# Patient Record
Sex: FEMALE | Race: BLACK OR AFRICAN AMERICAN | NOT HISPANIC OR LATINO | URBAN - METROPOLITAN AREA
[De-identification: names, ages, dates, MRNs, and addresses within clinical notes are randomized per-mention and may not be internally consistent; named-entity substitution may affect disease eponyms.]

---

## 2017-04-06 ENCOUNTER — EMERGENCY (EMERGENCY)
Facility: HOSPITAL | Age: 37
LOS: 1 days | Discharge: HOME | End: 2017-04-06
Attending: EMERGENCY MEDICINE
Payer: COMMERCIAL

## 2017-04-06 VITALS
HEIGHT: 66 IN | TEMPERATURE: 98 F | DIASTOLIC BLOOD PRESSURE: 76 MMHG | SYSTOLIC BLOOD PRESSURE: 123 MMHG | HEART RATE: 85 BPM | OXYGEN SATURATION: 98 % | RESPIRATION RATE: 18 BRPM | WEIGHT: 139.99 LBS

## 2017-04-06 VITALS
HEART RATE: 76 BPM | DIASTOLIC BLOOD PRESSURE: 68 MMHG | OXYGEN SATURATION: 98 % | SYSTOLIC BLOOD PRESSURE: 103 MMHG | TEMPERATURE: 98 F | RESPIRATION RATE: 18 BRPM

## 2017-04-06 LAB
ALBUMIN SERPL ELPH-MCNC: 4.7 G/DL — SIGNIFICANT CHANGE UP (ref 3.5–5)
ALP SERPL-CCNC: 72 U/L — SIGNIFICANT CHANGE UP (ref 40–120)
ALT FLD-CCNC: 26 U/L DA — SIGNIFICANT CHANGE UP (ref 10–60)
ANION GAP SERPL CALC-SCNC: 7 MMOL/L — SIGNIFICANT CHANGE UP (ref 5–17)
APPEARANCE UR: CLEAR — SIGNIFICANT CHANGE UP
AST SERPL-CCNC: 18 U/L — SIGNIFICANT CHANGE UP (ref 10–40)
BASOPHILS # BLD AUTO: 0.1 K/UL — SIGNIFICANT CHANGE UP (ref 0–0.2)
BASOPHILS NFR BLD AUTO: 1.5 % — SIGNIFICANT CHANGE UP (ref 0–2)
BILIRUB SERPL-MCNC: 0.7 MG/DL — SIGNIFICANT CHANGE UP (ref 0.2–1.2)
BILIRUB UR-MCNC: NEGATIVE — SIGNIFICANT CHANGE UP
BUN SERPL-MCNC: 11 MG/DL — SIGNIFICANT CHANGE UP (ref 7–18)
CALCIUM SERPL-MCNC: 9.5 MG/DL — SIGNIFICANT CHANGE UP (ref 8.4–10.5)
CHLORIDE SERPL-SCNC: 106 MMOL/L — SIGNIFICANT CHANGE UP (ref 96–108)
CO2 SERPL-SCNC: 27 MMOL/L — SIGNIFICANT CHANGE UP (ref 22–31)
COLOR SPEC: YELLOW — SIGNIFICANT CHANGE UP
CREAT SERPL-MCNC: 0.76 MG/DL — SIGNIFICANT CHANGE UP (ref 0.5–1.3)
DIFF PNL FLD: ABNORMAL
EOSINOPHIL # BLD AUTO: 0.1 K/UL — SIGNIFICANT CHANGE UP (ref 0–0.5)
EOSINOPHIL NFR BLD AUTO: 1.3 % — SIGNIFICANT CHANGE UP (ref 0–6)
GLUCOSE SERPL-MCNC: 84 MG/DL — SIGNIFICANT CHANGE UP (ref 70–99)
GLUCOSE UR QL: NEGATIVE — SIGNIFICANT CHANGE UP
HCG UR QL: NEGATIVE — SIGNIFICANT CHANGE UP
HCT VFR BLD CALC: 47 % — HIGH (ref 34.5–45)
HGB BLD-MCNC: 15.1 G/DL — SIGNIFICANT CHANGE UP (ref 11.5–15.5)
KETONES UR-MCNC: NEGATIVE — SIGNIFICANT CHANGE UP
LEUKOCYTE ESTERASE UR-ACNC: NEGATIVE — SIGNIFICANT CHANGE UP
LIDOCAIN IGE QN: 179 U/L — SIGNIFICANT CHANGE UP (ref 73–393)
LYMPHOCYTES # BLD AUTO: 1.6 K/UL — SIGNIFICANT CHANGE UP (ref 1–3.3)
LYMPHOCYTES # BLD AUTO: 29 % — SIGNIFICANT CHANGE UP (ref 13–44)
MCHC RBC-ENTMCNC: 27.9 PG — SIGNIFICANT CHANGE UP (ref 27–34)
MCHC RBC-ENTMCNC: 32.1 GM/DL — SIGNIFICANT CHANGE UP (ref 32–36)
MCV RBC AUTO: 86.9 FL — SIGNIFICANT CHANGE UP (ref 80–100)
MONOCYTES # BLD AUTO: 0.3 K/UL — SIGNIFICANT CHANGE UP (ref 0–0.9)
MONOCYTES NFR BLD AUTO: 6 % — SIGNIFICANT CHANGE UP (ref 2–14)
NEUTROPHILS # BLD AUTO: 3.4 K/UL — SIGNIFICANT CHANGE UP (ref 1.8–7.4)
NEUTROPHILS NFR BLD AUTO: 62.2 % — SIGNIFICANT CHANGE UP (ref 43–77)
NITRITE UR-MCNC: NEGATIVE — SIGNIFICANT CHANGE UP
PH UR: 6 — SIGNIFICANT CHANGE UP (ref 4.8–8)
PLATELET # BLD AUTO: 247 K/UL — SIGNIFICANT CHANGE UP (ref 150–400)
POTASSIUM SERPL-MCNC: 4.1 MMOL/L — SIGNIFICANT CHANGE UP (ref 3.5–5.3)
POTASSIUM SERPL-SCNC: 4.1 MMOL/L — SIGNIFICANT CHANGE UP (ref 3.5–5.3)
PROT SERPL-MCNC: 8.6 G/DL — HIGH (ref 6–8.3)
PROT UR-MCNC: NEGATIVE — SIGNIFICANT CHANGE UP
RBC # BLD: 5.41 M/UL — HIGH (ref 3.8–5.2)
RBC # FLD: 13.3 % — SIGNIFICANT CHANGE UP (ref 10.3–14.5)
SODIUM SERPL-SCNC: 140 MMOL/L — SIGNIFICANT CHANGE UP (ref 135–145)
SP GR SPEC: 1.01 — SIGNIFICANT CHANGE UP (ref 1.01–1.02)
UROBILINOGEN FLD QL: NEGATIVE — SIGNIFICANT CHANGE UP
WBC # BLD: 5.5 K/UL — SIGNIFICANT CHANGE UP (ref 3.8–10.5)
WBC # FLD AUTO: 5.5 K/UL — SIGNIFICANT CHANGE UP (ref 3.8–10.5)

## 2017-04-06 PROCEDURE — 80053 COMPREHEN METABOLIC PANEL: CPT

## 2017-04-06 PROCEDURE — 71046 X-RAY EXAM CHEST 2 VIEWS: CPT

## 2017-04-06 PROCEDURE — 71020: CPT | Mod: 26

## 2017-04-06 PROCEDURE — 83690 ASSAY OF LIPASE: CPT

## 2017-04-06 PROCEDURE — 99053 MED SERV 10PM-8AM 24 HR FAC: CPT

## 2017-04-06 PROCEDURE — 85027 COMPLETE CBC AUTOMATED: CPT

## 2017-04-06 PROCEDURE — 76705 ECHO EXAM OF ABDOMEN: CPT

## 2017-04-06 PROCEDURE — 99285 EMERGENCY DEPT VISIT HI MDM: CPT

## 2017-04-06 PROCEDURE — 81001 URINALYSIS AUTO W/SCOPE: CPT

## 2017-04-06 PROCEDURE — 99284 EMERGENCY DEPT VISIT MOD MDM: CPT | Mod: 25

## 2017-04-06 PROCEDURE — 76705 ECHO EXAM OF ABDOMEN: CPT | Mod: 26

## 2017-04-06 PROCEDURE — 81025 URINE PREGNANCY TEST: CPT

## 2017-04-06 NOTE — ED ADULT NURSE NOTE - CHIEF COMPLAINT QUOTE
came back from [facility] last march 29,right upper abdominal pain since saturday,nausea,

## 2017-04-06 NOTE — ED ADULT TRIAGE NOTE - CHIEF COMPLAINT QUOTE
came back from [facility] last march 29,right upper abdominal pain since saturday,nausea,

## 2017-04-06 NOTE — ED ADULT NURSE NOTE - OBJECTIVE STATEMENT
37 Years old female received sitting in bed alert and oriented x3, breathing spontaneously on room, patient stated she has been experience persistent RUQ pains from Saturday with nausea. Denies any fever, vomiting, diarrhea or constipation. P/S 7/10. Patient stated that she has fibroids.

## 2017-04-06 NOTE — ED PROVIDER NOTE - OBJECTIVE STATEMENT
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 Pt also notes nausea. Pt reports no clear association with food and no sx like this in the past. Pt returned from [facility] 2 weeks ago. Pt is attempting to get pregnant, not using hormonal treatments. Pt denies surgeries, vomiting, diarrhea, fever, chills, CP, SOB, respiration variation in pain, leg swelling or any other complaints at this time. NKDA. 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 Pt also notes nausea. Pt reports no clear association with food and no sx like this in the past. Pt returned from [facility] 2 weeks ago. Pt is attempting to get pregnant, not using hormonal treatments. Pt denies surgeries, vomiting, diarrhea, fever, chills, CP, SOB, respiration variation in pain, leg swelling or any other complaints at this time. NKDA. LMP: 3/10/17 Endorses vaginal spotting today.

## 2017-04-06 NOTE — ED PROVIDER NOTE - NS ED MD SCRIBE ATTENDING SCRIBE SECTIONS
VITAL SIGNS( Pullset)/HISTORY OF PRESENT ILLNESS/PHYSICAL EXAM/REVIEW OF SYSTEMS/PAST MEDICAL/SURGICAL/SOCIAL HISTORY/DISPOSITION/HIV

## 2017-04-06 NOTE — ED PROVIDER NOTE - MEDICAL DECISION MAKING DETAILS
38 y/o F with 4 days worsening RUQ pain and tenderness on exam consider gallbladder pathology. Unlikely pulmonary pathology. Will confirm with sono, labs, urine preg and reassess. Pt is declining analgesia at this time.

## 2017-04-10 DIAGNOSIS — Z91.013 ALLERGY TO SEAFOOD: ICD-10-CM

## 2017-04-10 DIAGNOSIS — R11.0 NAUSEA: ICD-10-CM

## 2017-04-10 DIAGNOSIS — R10.11 RIGHT UPPER QUADRANT PAIN: ICD-10-CM

## 2018-01-31 PROBLEM — Z00.00 ENCOUNTER FOR PREVENTIVE HEALTH EXAMINATION: Status: ACTIVE | Noted: 2018-01-31

## 2018-02-12 ENCOUNTER — APPOINTMENT (OUTPATIENT)
Dept: HUMAN REPRODUCTION | Facility: CLINIC | Age: 38
End: 2018-02-12

## 2018-04-04 ENCOUNTER — EMERGENCY (EMERGENCY)
Facility: HOSPITAL | Age: 38
LOS: 1 days | Discharge: HOME | End: 2018-04-04
Attending: EMERGENCY MEDICINE | Admitting: EMERGENCY MEDICINE
Payer: COMMERCIAL

## 2018-04-04 VITALS
DIASTOLIC BLOOD PRESSURE: 77 MMHG | OXYGEN SATURATION: 99 % | RESPIRATION RATE: 18 BRPM | HEART RATE: 93 BPM | SYSTOLIC BLOOD PRESSURE: 118 MMHG | WEIGHT: 136.91 LBS | TEMPERATURE: 98 F | HEIGHT: 66 IN

## 2018-04-04 DIAGNOSIS — Z91.013 ALLERGY TO SEAFOOD: ICD-10-CM

## 2018-04-04 DIAGNOSIS — R10.13 EPIGASTRIC PAIN: ICD-10-CM

## 2018-04-04 DIAGNOSIS — O26.892 OTHER SPECIFIED PREGNANCY RELATED CONDITIONS, SECOND TRIMESTER: ICD-10-CM

## 2018-04-04 DIAGNOSIS — Z88.8 ALLERGY STATUS TO OTHER DRUGS, MEDICAMENTS AND BIOLOGICAL SUBSTANCES: ICD-10-CM

## 2018-04-04 DIAGNOSIS — Z3A.15 15 WEEKS GESTATION OF PREGNANCY: ICD-10-CM

## 2018-04-04 PROCEDURE — 99284 EMERGENCY DEPT VISIT MOD MDM: CPT | Mod: 25

## 2018-04-04 NOTE — ED ADULT TRIAGE NOTE - CHIEF COMPLAINT QUOTE
Patient 15 weeks pregnant. C/O epigastric pain, nausea, 1 episode of vomiting. denies vaginal bleeding.

## 2018-04-05 VITALS
SYSTOLIC BLOOD PRESSURE: 112 MMHG | TEMPERATURE: 99 F | RESPIRATION RATE: 18 BRPM | OXYGEN SATURATION: 99 % | DIASTOLIC BLOOD PRESSURE: 72 MMHG | HEART RATE: 81 BPM

## 2018-04-05 PROCEDURE — 99284 EMERGENCY DEPT VISIT MOD MDM: CPT | Mod: 25

## 2018-04-05 PROCEDURE — 83690 ASSAY OF LIPASE: CPT

## 2018-04-05 PROCEDURE — 85730 THROMBOPLASTIN TIME PARTIAL: CPT

## 2018-04-05 PROCEDURE — 84702 CHORIONIC GONADOTROPIN TEST: CPT

## 2018-04-05 PROCEDURE — 85610 PROTHROMBIN TIME: CPT

## 2018-04-05 PROCEDURE — 81003 URINALYSIS AUTO W/O SCOPE: CPT

## 2018-04-05 PROCEDURE — 96374 THER/PROPH/DIAG INJ IV PUSH: CPT

## 2018-04-05 PROCEDURE — 36415 COLL VENOUS BLD VENIPUNCTURE: CPT

## 2018-04-05 PROCEDURE — 87086 URINE CULTURE/COLONY COUNT: CPT

## 2018-04-05 PROCEDURE — 85025 COMPLETE CBC W/AUTO DIFF WBC: CPT

## 2018-04-05 PROCEDURE — 80053 COMPREHEN METABOLIC PANEL: CPT

## 2018-04-05 RX ORDER — SODIUM CHLORIDE 9 MG/ML
1000 INJECTION INTRAMUSCULAR; INTRAVENOUS; SUBCUTANEOUS ONCE
Qty: 0 | Refills: 0 | Status: DISCONTINUED | OUTPATIENT
Start: 2018-04-05 | End: 2018-04-05

## 2018-04-05 RX ORDER — FAMOTIDINE 10 MG/ML
20 INJECTION INTRAVENOUS ONCE
Qty: 0 | Refills: 0 | Status: COMPLETED | OUTPATIENT
Start: 2018-04-05 | End: 2018-04-05

## 2018-04-05 RX ORDER — SODIUM CHLORIDE 9 MG/ML
1000 INJECTION INTRAMUSCULAR; INTRAVENOUS; SUBCUTANEOUS ONCE
Qty: 0 | Refills: 0 | Status: COMPLETED | OUTPATIENT
Start: 2018-04-05 | End: 2018-04-05

## 2018-04-05 RX ORDER — SUCRALFATE 1 G
1 TABLET ORAL ONCE
Qty: 0 | Refills: 0 | Status: COMPLETED | OUTPATIENT
Start: 2018-04-05 | End: 2018-04-05

## 2018-04-05 RX ORDER — SUCRALFATE 1 G
1 TABLET ORAL
Qty: 56 | Refills: 0 | OUTPATIENT
Start: 2018-04-05 | End: 2018-04-18

## 2018-04-05 RX ORDER — LIDOCAINE 4 G/100G
10 CREAM TOPICAL ONCE
Qty: 0 | Refills: 0 | Status: COMPLETED | OUTPATIENT
Start: 2018-04-05 | End: 2018-04-05

## 2018-04-05 RX ADMIN — SODIUM CHLORIDE 500 MILLILITER(S): 9 INJECTION INTRAMUSCULAR; INTRAVENOUS; SUBCUTANEOUS at 00:16

## 2018-04-05 RX ADMIN — Medication 10 MILLIGRAM(S): at 00:23

## 2018-04-05 RX ADMIN — Medication 30 MILLILITER(S): at 00:23

## 2018-04-05 RX ADMIN — LIDOCAINE 10 MILLILITER(S): 4 CREAM TOPICAL at 00:23

## 2018-04-05 RX ADMIN — Medication 1 GRAM(S): at 02:25

## 2018-04-05 RX ADMIN — FAMOTIDINE 20 MILLIGRAM(S): 10 INJECTION INTRAVENOUS at 00:23

## 2018-04-05 NOTE — ED PROVIDER NOTE - PROGRESS NOTE DETAILS
tolerating PO, reports some improvement of sx after initial medications feels improved after sucralfate, rx sent, GI follow up given, return precautions given

## 2018-04-05 NOTE — ED PROVIDER NOTE - MEDICAL DECISION MAKING DETAILS
epigastric abd pain tonight w/ vomiting, no hx of biliary disease in the past, 15wk GA, +bm and flatus tonight, no prior abd surg, will check labs, analgesia, reassess

## 2018-04-05 NOTE — ED ADULT NURSE NOTE - OBJECTIVE STATEMENT
37 y/o female, , who is 15 weeks pregnant arrived to [facility] ER reporting abdominal pain a radiating to epigastrium accompanied with nausea and vomiting X 1. Pt verbalized that she has been experiencing nausea for the entire pregnancy but became concerned when pain started. Upon assessment, lung fields WNL, breathing is equal and unlabored, pulses palpable, no visible injuries noted. Pt denies headache, dizziness, blurred vision, slurred speech, diarrhea, fever, chills, LOC, vaginal bleeding, SOB, weakness, fatigue, recent travel, recent injury, and palpitations. Care in progress. Awaiting disposition

## 2018-04-05 NOTE — ED PROVIDER NOTE - OBJECTIVE STATEMENT
38 yof pw upper abd pain tonight, around 930pm.  states all day felt bloated but able to keep food down.  tonight had some grapes after getting home, around 930pm, sx began w/ abd pain and vomiting.  15wk GA, .  states increased nausea since pregnancy but today vomited (usually just dry heaving), no prior abd surg, had BM tonight w/ flatus, no hx of biliary pathology, no skin color change.  self diagnosed w/ gastritis in the past (states abd pain in the past improved w/ protonix otc) but no prior endoscopy or GI evaluation.  no urinary sx, no bleeding, no fc, no sob.

## 2018-04-05 NOTE — ED PROVIDER NOTE - PHYSICAL EXAMINATION
CON: ao x 3, HENMT: clear oropharynx, soft neck, HEAD: atraumatic, CV: rrr, equal pulses b/l, RESP: cta b/l, GI: +BS, soft, tender epigastrium, no rebound, no guarding, SKIN: no rash, MSK: no deformities, NEURO: no gross motor or sensory deficit

## 2018-06-22 ENCOUNTER — OUTPATIENT (OUTPATIENT)
Dept: OUTPATIENT SERVICES | Facility: HOSPITAL | Age: 38
LOS: 1 days | Discharge: HOME | End: 2018-06-22
Payer: COMMERCIAL

## 2018-06-22 DIAGNOSIS — R10.31 RIGHT LOWER QUADRANT PAIN: ICD-10-CM

## 2018-06-22 DIAGNOSIS — F40.240 CLAUSTROPHOBIA: ICD-10-CM

## 2018-06-22 DIAGNOSIS — O99.612 DISEASES OF THE DIGESTIVE SYSTEM COMPLICATING PREGNANCY, SECOND TRIMESTER: ICD-10-CM

## 2018-06-22 DIAGNOSIS — Z91.041 RADIOGRAPHIC DYE ALLERGY STATUS: ICD-10-CM

## 2018-06-22 DIAGNOSIS — O99.342 OTHER MENTAL DISORDERS COMPLICATING PREGNANCY, SECOND TRIMESTER: ICD-10-CM

## 2018-06-22 DIAGNOSIS — O99.89 OTHER SPECIFIED DISEASES AND CONDITIONS COMPLICATING PREGNANCY, CHILDBIRTH AND THE PUERPERIUM: ICD-10-CM

## 2018-06-22 DIAGNOSIS — K37 UNSPECIFIED APPENDICITIS: ICD-10-CM

## 2018-06-22 DIAGNOSIS — O26.899 OTHER SPECIFIED PREGNANCY RELATED CONDITIONS, UNSPECIFIED TRIMESTER: ICD-10-CM

## 2018-06-22 DIAGNOSIS — Z3A.00 WEEKS OF GESTATION OF PREGNANCY NOT SPECIFIED: ICD-10-CM

## 2018-06-22 DIAGNOSIS — Z53.29 PROCEDURE AND TREATMENT NOT CARRIED OUT BECAUSE OF PATIENT'S DECISION FOR OTHER REASONS: ICD-10-CM

## 2018-06-22 DIAGNOSIS — Z3A.26 26 WEEKS GESTATION OF PREGNANCY: ICD-10-CM

## 2018-06-22 DIAGNOSIS — Z91.013 ALLERGY TO SEAFOOD: ICD-10-CM

## 2018-06-22 LAB
ALBUMIN SERPL ELPH-MCNC: 3.9 G/DL — SIGNIFICANT CHANGE UP (ref 3.3–5)
ALP SERPL-CCNC: 52 U/L — SIGNIFICANT CHANGE UP (ref 40–120)
ALT FLD-CCNC: 11 U/L — SIGNIFICANT CHANGE UP (ref 10–45)
AMYLASE P1 CFR SERPL: 63 U/L — SIGNIFICANT CHANGE UP (ref 25–125)
ANION GAP SERPL CALC-SCNC: 16 MMOL/L — SIGNIFICANT CHANGE UP (ref 5–17)
APPEARANCE UR: CLEAR — SIGNIFICANT CHANGE UP
AST SERPL-CCNC: 14 U/L — SIGNIFICANT CHANGE UP (ref 10–40)
BILIRUB SERPL-MCNC: 0.4 MG/DL — SIGNIFICANT CHANGE UP (ref 0.2–1.2)
BILIRUB UR-MCNC: NEGATIVE — SIGNIFICANT CHANGE UP
BUN SERPL-MCNC: 5 MG/DL — LOW (ref 7–23)
CALCIUM SERPL-MCNC: 9.7 MG/DL — SIGNIFICANT CHANGE UP (ref 8.4–10.5)
CHLORIDE SERPL-SCNC: 98 MMOL/L — SIGNIFICANT CHANGE UP (ref 96–108)
CO2 SERPL-SCNC: 19 MMOL/L — LOW (ref 22–31)
COLOR SPEC: YELLOW — SIGNIFICANT CHANGE UP
CREAT SERPL-MCNC: 0.53 MG/DL — SIGNIFICANT CHANGE UP (ref 0.5–1.3)
DIFF PNL FLD: NEGATIVE — SIGNIFICANT CHANGE UP
GLUCOSE SERPL-MCNC: 82 MG/DL — SIGNIFICANT CHANGE UP (ref 70–99)
GLUCOSE UR QL: NEGATIVE — SIGNIFICANT CHANGE UP
HCT VFR BLD CALC: 38.2 % — SIGNIFICANT CHANGE UP (ref 34.5–45)
HGB BLD-MCNC: 12.7 G/DL — SIGNIFICANT CHANGE UP (ref 11.5–15.5)
KETONES UR-MCNC: 15 MG/DL
LEUKOCYTE ESTERASE UR-ACNC: NEGATIVE — SIGNIFICANT CHANGE UP
LIDOCAIN IGE QN: 23 U/L — SIGNIFICANT CHANGE UP (ref 7–60)
MCHC RBC-ENTMCNC: 28 PG — SIGNIFICANT CHANGE UP (ref 27–34)
MCHC RBC-ENTMCNC: 33.2 G/DL — SIGNIFICANT CHANGE UP (ref 32–36)
MCV RBC AUTO: 84.3 FL — SIGNIFICANT CHANGE UP (ref 80–100)
NITRITE UR-MCNC: NEGATIVE — SIGNIFICANT CHANGE UP
PH UR: 6 — SIGNIFICANT CHANGE UP (ref 5–8)
PLATELET # BLD AUTO: 205 K/UL — SIGNIFICANT CHANGE UP (ref 150–400)
POTASSIUM SERPL-MCNC: 3.6 MMOL/L — SIGNIFICANT CHANGE UP (ref 3.5–5.3)
POTASSIUM SERPL-SCNC: 3.6 MMOL/L — SIGNIFICANT CHANGE UP (ref 3.5–5.3)
PROT SERPL-MCNC: 6.9 G/DL — SIGNIFICANT CHANGE UP (ref 6–8.3)
PROT UR-MCNC: NEGATIVE MG/DL — SIGNIFICANT CHANGE UP
RBC # BLD: 4.53 M/UL — SIGNIFICANT CHANGE UP (ref 3.8–5.2)
RBC # FLD: 14.5 % — SIGNIFICANT CHANGE UP (ref 10.3–16.9)
SODIUM SERPL-SCNC: 133 MMOL/L — LOW (ref 135–145)
SP GR SPEC: <=1.005 — SIGNIFICANT CHANGE UP (ref 1–1.03)
UROBILINOGEN FLD QL: 0.2 E.U./DL — SIGNIFICANT CHANGE UP
WBC # BLD: 10.6 K/UL — HIGH (ref 3.8–10.5)
WBC # FLD AUTO: 10.6 K/UL — HIGH (ref 3.8–10.5)

## 2018-06-22 PROCEDURE — 76705 ECHO EXAM OF ABDOMEN: CPT | Mod: 26

## 2018-06-22 RX ORDER — SODIUM CHLORIDE 9 MG/ML
1000 INJECTION, SOLUTION INTRAVENOUS
Qty: 0 | Refills: 0 | Status: DISCONTINUED | OUTPATIENT
Start: 2018-06-22 | End: 2018-06-23

## 2018-06-22 RX ADMIN — SODIUM CHLORIDE 125 MILLILITER(S): 9 INJECTION, SOLUTION INTRAVENOUS at 19:18

## 2018-06-22 NOTE — CONSULT NOTE ADULT - SUBJECTIVE AND OBJECTIVE BOX
39 yo F  @ 26 weeks pregnant presents with abdominal pain x 2 days, started diffuse lower abdomen, localized to RLQ. Accompanied by N/V/anorexia. Pain worse with walking. Constant stabbing pain, nothing alleviates. Never had pain like this before.     PMH: none PSH: none    Temperature taken while bedside- afebrile, HD stable  Comfortable in NAD  Abdomen gravid with baby head palpable in R side. Tender lateral to baby's head. No rebound or guarding. (-) rovsing/psoas/obturator                          12.7   10.6  )-----------( 205      ( 2018 17:21 )             38.2

## 2018-06-22 NOTE — CHART NOTE - NSCHARTNOTEFT_GEN_A_CORE
Patient examined for baseline abdominal exam. Denies nausea or vomiting at this time.    Vitals: stable and within normal limits.  Gen: NAD, resting comfortably  C/V: NSR  Pulm: Nonlabored breathing, no respiratory distress  Abd: Gravid - 26 weeks. Abdomen is mildly distended but soft. Patient reports mild tenderness in all quadrants except RLQ. No guarding or rebound.   Extrem: WWP, no calf edema, SCDs in place.

## 2018-06-22 NOTE — CHART NOTE - NSCHARTNOTEFT_GEN_A_CORE
Called because patient refused recommended MRI. US unable to visualize appendix.   Patient reports pain improved.  Patient does not want MRI d/t claustrophobia. Does not want any therapy or medication to alleviate claustrophobia-- requests to leave.   Although patient's pain improved, cannot definitively rule out appendicitis without imaging.   If patient did have appendicitis and was discharged or imaging was delayed, appendicitis or perforation thereof could be dangerous for pregnancy and for patient.   Recommend MRI (or CT scan if okay with GYN) to definitively rule out appendicitis.   If claustrophobia is main issue, could pursue transfer to other hospitals with different MRI machines such as an "open MRI machine."   Discussed with attending and she agrees that patient needs imaging even if feels better because we have not definitively ruled out appendicitis and delaying diagnosis could be harmful to pregnancy and patient.

## 2018-06-22 NOTE — CONSULT NOTE ADULT - ATTENDING COMMENTS
Patient seen and examined at bedside on OB floor on 2018 around 1:15PM.  Reports feeling better.  Pain has resolved.  Eating a bagel currently.  Abdomen soft, NT, gravid uterus.    Labs, US reviewed.  Unable to visualize appendix on US.    Impression: 38F 26 weeks pregnant with RLQ pain on admission, now resolved, with non-diagnostic ultrasound.  Recommend:  -Discussed with patient and  that as the US was non-diagnostic, the only method to rule out acute appendicitis is further imaging (CT abdomen/pelvis or MRI abdomen/pelvis), or surgery.  Patient is currently refusing CT (she wishes to avoid radiation exposure) and MRI (patient is reportedly claustrophobic and does not want sedation as she and her  are not sure the sedation is safe, and we cannot perform an open MRI here).  Laparoscopy for diagnostic purposes also presents risks to the pregnancy ( labor, etc), and therefore would not be the first-line choice.  Also discussed risks of  labor and/or fetal loss if there is an undiagnosed intra-abdominal infection.      As intra-abdominal infection cannot be definitively ruled out without further imaging, would not recommend discharge at this time.    Patient and  to decide how they wish to proceed.

## 2018-06-22 NOTE — CONSULT NOTE ADULT - ASSESSMENT
37 yo F  @ 26 weeks pregnant with abdominal pain. R/O appendicitis.   -Obtain MRI if possible, please.   -Will follow. 4C.     Discussed with attending.

## 2018-06-23 VITALS — WEIGHT: 145.51 LBS

## 2018-06-23 LAB
ALBUMIN SERPL ELPH-MCNC: 3.4 G/DL — SIGNIFICANT CHANGE UP (ref 3.3–5)
ALP SERPL-CCNC: 44 U/L — SIGNIFICANT CHANGE UP (ref 40–120)
ALT FLD-CCNC: 11 U/L — SIGNIFICANT CHANGE UP (ref 10–45)
ANION GAP SERPL CALC-SCNC: 13 MMOL/L — SIGNIFICANT CHANGE UP (ref 5–17)
AST SERPL-CCNC: 13 U/L — SIGNIFICANT CHANGE UP (ref 10–40)
BILIRUB SERPL-MCNC: 0.4 MG/DL — SIGNIFICANT CHANGE UP (ref 0.2–1.2)
BUN SERPL-MCNC: 5 MG/DL — LOW (ref 7–23)
CALCIUM SERPL-MCNC: 8.8 MG/DL — SIGNIFICANT CHANGE UP (ref 8.4–10.5)
CHLORIDE SERPL-SCNC: 104 MMOL/L — SIGNIFICANT CHANGE UP (ref 96–108)
CO2 SERPL-SCNC: 21 MMOL/L — LOW (ref 22–31)
CREAT SERPL-MCNC: 0.52 MG/DL — SIGNIFICANT CHANGE UP (ref 0.5–1.3)
CULTURE RESULTS: NO GROWTH — SIGNIFICANT CHANGE UP
GLUCOSE SERPL-MCNC: 84 MG/DL — SIGNIFICANT CHANGE UP (ref 70–99)
HCT VFR BLD CALC: 32.1 % — LOW (ref 34.5–45)
HCT VFR BLD CALC: 35.3 % — SIGNIFICANT CHANGE UP (ref 34.5–45)
HCT VFR BLD CALC: 37.3 % — SIGNIFICANT CHANGE UP (ref 34.5–45)
HGB BLD-MCNC: 10.8 G/DL — LOW (ref 11.5–15.5)
HGB BLD-MCNC: 11.7 G/DL — SIGNIFICANT CHANGE UP (ref 11.5–15.5)
HGB BLD-MCNC: 12.2 G/DL — SIGNIFICANT CHANGE UP (ref 11.5–15.5)
MCHC RBC-ENTMCNC: 27.7 PG — SIGNIFICANT CHANGE UP (ref 27–34)
MCHC RBC-ENTMCNC: 27.9 PG — SIGNIFICANT CHANGE UP (ref 27–34)
MCHC RBC-ENTMCNC: 28.3 PG — SIGNIFICANT CHANGE UP (ref 27–34)
MCHC RBC-ENTMCNC: 32.7 G/DL — SIGNIFICANT CHANGE UP (ref 32–36)
MCHC RBC-ENTMCNC: 33.1 G/DL — SIGNIFICANT CHANGE UP (ref 32–36)
MCHC RBC-ENTMCNC: 33.6 G/DL — SIGNIFICANT CHANGE UP (ref 32–36)
MCV RBC AUTO: 84 FL — SIGNIFICANT CHANGE UP (ref 80–100)
MCV RBC AUTO: 84.3 FL — SIGNIFICANT CHANGE UP (ref 80–100)
MCV RBC AUTO: 84.6 FL — SIGNIFICANT CHANGE UP (ref 80–100)
PLATELET # BLD AUTO: 193 K/UL — SIGNIFICANT CHANGE UP (ref 150–400)
PLATELET # BLD AUTO: 199 K/UL — SIGNIFICANT CHANGE UP (ref 150–400)
PLATELET # BLD AUTO: 219 K/UL — SIGNIFICANT CHANGE UP (ref 150–400)
POTASSIUM SERPL-MCNC: 3.4 MMOL/L — LOW (ref 3.5–5.3)
POTASSIUM SERPL-SCNC: 3.4 MMOL/L — LOW (ref 3.5–5.3)
PROT SERPL-MCNC: 6 G/DL — SIGNIFICANT CHANGE UP (ref 6–8.3)
RBC # BLD: 3.81 M/UL — SIGNIFICANT CHANGE UP (ref 3.8–5.2)
RBC # BLD: 4.2 M/UL — SIGNIFICANT CHANGE UP (ref 3.8–5.2)
RBC # BLD: 4.41 M/UL — SIGNIFICANT CHANGE UP (ref 3.8–5.2)
RBC # FLD: 14.3 % — SIGNIFICANT CHANGE UP (ref 10.3–16.9)
RBC # FLD: 14.4 % — SIGNIFICANT CHANGE UP (ref 10.3–16.9)
RBC # FLD: 14.7 % — SIGNIFICANT CHANGE UP (ref 10.3–16.9)
SODIUM SERPL-SCNC: 138 MMOL/L — SIGNIFICANT CHANGE UP (ref 135–145)
SPECIMEN SOURCE: SIGNIFICANT CHANGE UP
WBC # BLD: 11.3 K/UL — HIGH (ref 3.8–10.5)
WBC # BLD: 8.4 K/UL — SIGNIFICANT CHANGE UP (ref 3.8–10.5)
WBC # BLD: 9.2 K/UL — SIGNIFICANT CHANGE UP (ref 3.8–10.5)
WBC # FLD AUTO: 11.3 K/UL — HIGH (ref 3.8–10.5)
WBC # FLD AUTO: 8.4 K/UL — SIGNIFICANT CHANGE UP (ref 3.8–10.5)
WBC # FLD AUTO: 9.2 K/UL — SIGNIFICANT CHANGE UP (ref 3.8–10.5)

## 2018-06-23 PROCEDURE — 87086 URINE CULTURE/COLONY COUNT: CPT

## 2018-06-23 PROCEDURE — 99214 OFFICE O/P EST MOD 30 MIN: CPT

## 2018-06-23 PROCEDURE — 82150 ASSAY OF AMYLASE: CPT

## 2018-06-23 PROCEDURE — 80053 COMPREHEN METABOLIC PANEL: CPT

## 2018-06-23 PROCEDURE — 81003 URINALYSIS AUTO W/O SCOPE: CPT

## 2018-06-23 PROCEDURE — 76705 ECHO EXAM OF ABDOMEN: CPT

## 2018-06-23 PROCEDURE — 85027 COMPLETE CBC AUTOMATED: CPT

## 2018-06-23 PROCEDURE — 36415 COLL VENOUS BLD VENIPUNCTURE: CPT

## 2018-06-23 PROCEDURE — 83690 ASSAY OF LIPASE: CPT

## 2018-06-23 RX ORDER — FERROUS SULFATE 325(65) MG
325 TABLET ORAL DAILY
Qty: 0 | Refills: 0 | Status: DISCONTINUED | OUTPATIENT
Start: 2018-06-23 | End: 2018-06-23

## 2018-06-23 RX ORDER — FOLIC ACID 0.8 MG
1 TABLET ORAL DAILY
Qty: 0 | Refills: 0 | Status: DISCONTINUED | OUTPATIENT
Start: 2018-06-23 | End: 2018-06-23

## 2018-06-23 NOTE — PATIENT PROFILE OB - NS PRO PT RIGHT SUPPORT PERSON
Declines Airway patent, Nasal mucosa clear. Mouth with normal mucosa. Throat has no vesicles, no oropharyngeal exudates and uvula is midline.

## 2018-06-23 NOTE — CHART NOTE - NSCHARTNOTEFT_GEN_A_CORE
Patient seen and examined by bedside at 1:00 AM. On physical exam, patient denies any abdominal pain. Abdomen is soft and non-distended.     Patient seen and examined again by bedside at 5:45 AM. Patient was sleeping and in no acute distress. On physical exam, patient denies any abdominal tenderness. Abdomen is soft and non-distended.

## 2018-09-23 ENCOUNTER — EMERGENCY (EMERGENCY)
Facility: HOSPITAL | Age: 38
LOS: 1 days | End: 2018-09-23
Payer: COMMERCIAL

## 2018-09-23 PROCEDURE — 93970 EXTREMITY STUDY: CPT | Mod: 26

## 2018-09-23 PROCEDURE — 99285 EMERGENCY DEPT VISIT HI MDM: CPT | Mod: 25

## 2018-09-23 PROCEDURE — 71046 X-RAY EXAM CHEST 2 VIEWS: CPT | Mod: 26

## 2018-09-23 PROCEDURE — 71275 CT ANGIOGRAPHY CHEST: CPT | Mod: 26

## 2018-09-23 PROCEDURE — 99283 EMERGENCY DEPT VISIT LOW MDM: CPT

## 2019-09-29 ENCOUNTER — EMERGENCY (EMERGENCY)
Facility: HOSPITAL | Age: 39
LOS: 1 days | End: 2019-09-29
Admitting: EMERGENCY MEDICINE
Payer: COMMERCIAL

## 2019-09-29 PROCEDURE — 99284 EMERGENCY DEPT VISIT MOD MDM: CPT

## 2019-09-29 PROCEDURE — 93971 EXTREMITY STUDY: CPT | Mod: 26,RT

## 2024-04-17 PROBLEM — K29.70 GASTRITIS, UNSPECIFIED, WITHOUT BLEEDING: Chronic | Status: ACTIVE | Noted: 2018-04-05
